# Patient Record
Sex: FEMALE | Race: WHITE | NOT HISPANIC OR LATINO | Employment: UNEMPLOYED | URBAN - METROPOLITAN AREA
[De-identification: names, ages, dates, MRNs, and addresses within clinical notes are randomized per-mention and may not be internally consistent; named-entity substitution may affect disease eponyms.]

---

## 2020-11-04 ENCOUNTER — TELEPHONE (OUTPATIENT)
Dept: NEUROSURGERY | Facility: CLINIC | Age: 65
End: 2020-11-04

## 2020-11-04 NOTE — TELEPHONE ENCOUNTER
THE PATIENT'S DAUGHTER KRISTAN CALLED TO CANCEL THE PATIENT'S APPOINTMENT ON 11/5 WITH DR. CANTOR BECAUSE THE PATIENT IS CURRENTLY IN THE HOSPITAL AND ON OXYGEN.     SHE WANTED TO RESCHEDULE AN APPOINTMENT BUT THE PATIENT WANTS TO BE SEEN IN [place], AND ALL APPOINTMENT TIMES FOR DR. CANTOR AT THE [place] OFFICE HAD THE MESSAGE 'THIS WOULD EXCEED THE AM 3 MAX VISIT TYPE SESSION LIMITS.'    PLEASE ADVISE ON SCHEDULING. KRISTAN ASKED THAT WE CALL THE PATIENT'S NUMBER [phone] TO SET UP AN APPOINTMENT.    THANK YOU!